# Patient Record
(demographics unavailable — no encounter records)

---

## 2024-12-06 NOTE — HEALTH RISK ASSESSMENT
[Good] : ~his/her~  mood as  good [Yes] : Yes [2 - 4 times a month (2 pts)] : 2-4 times a month (2 points) [1 or 2 (0 pts)] : 1 or 2 (0 points) [Never (0 pts)] : Never (0 points) [No] : In the past 12 months have you used drugs other than those required for medical reasons? No [No falls in past year] : Patient reported no falls in the past year [Little interest or pleasure doing things] : 1) Little interest or pleasure doing things [Feeling down, depressed, or hopeless] : 2) Feeling down, depressed, or hopeless [1] : 2) Feeling down, depressed, or hopeless for several days (1) [PHQ-2 Positive] : PHQ-2 Positive [Several Days (1)] : 7.) Trouble concentrating on things, such as reading a newspaper or watching television? Several days [Not at All (0)] : 9.) Thoughts that you would be off dead or of hurting yourself in some way? Not at all [Mild] : Severity of Depression is Mild [Somewhat Difficult] : How difficult have these problems made it for you to do your work, take care of things at home, or get along with people? Somewhat difficult [PHQ-9 Negative - No further assessment needed] : PHQ-9 Negative - No further assessment needed [Never] : Never [[id number]] : 2 [[id number]] : 7

## 2024-12-06 NOTE — HISTORY OF PRESENT ILLNESS
[FreeTextEntry1] : physical exam [de-identified] : MATT SHORE is a 42-year-old F who presents today for an annual physical. Pt last had a mammogram 12/2024 which revealed right breast asymmetry. Pt last saw OBGYN 11/2024. Pt last had a colonoscopy in 4/21. Pt was on Fluoxetine two years ago, which she has stopped on her own.

## 2024-12-06 NOTE — END OF VISIT
[FreeTextEntry3] : "I, Jon Parra, personally scribed the services dictated to me by Dr. Oscar Field MD in this documentation on 12/06/2024"   "I, Dr. Oscar Field MD, personally performed the services described in this documentation on 12/06/2024 for the patient as scribed by Jon Parra in my presence. I have reviewed and verified that all the information is accurate and true."

## 2024-12-06 NOTE — PLAN
[FreeTextEntry1] : Advised to lose weight Further instructions pending lab results Sent for Diagnostic Mammogram and Breast Sonogram

## 2025-01-24 NOTE — HISTORY OF PRESENT ILLNESS
[FreeTextEntry1] : Chart reviewed. 43 yo female, history of depression, anxiety here on video for psych follow up. Last saw Dr. Barlow Jan 2023, took Prozac for depression and anxiety in context of interpersonal difficulties with mother in law. Reports she thought Prozac helped but later self-discontinued.. Today, pt endorses bouts of depression but denies depressed mood and not interested to resume Prozac. She describes having difficulties establishing deep relationships, states she is paranoid, cannot trust people, overanalyzes, optimistic then cuts people off. States she does not want to be a nomad in terms of relationships but she is afraid to be vulnerable. She reports history of engaging in individual psychotherapy for 6 months three time. Psychoeducation provided re: long term individual psychotherapy, indications.

## 2025-01-24 NOTE — PLAN
[FreeTextEntry5] : No acute safety concerns. Psychotropic medication not indicated at this time. Recommend long term individual psychotherapy. Pt interested in [facility] referral. Routine medical follow up care. Psych follow up as needed.

## 2025-01-24 NOTE — REASON FOR VISIT
[Patient preference] : as per patient preference [Telehealth (audio & video) - Individual/Group] : This visit was provided via telehealth using real-time 2-way audio visual technology. [Home] : The patient, [unfilled], was located at home, [unfilled], at the time of the visit. [Participant(s) identity verified] : Participant(s) identity verified. [For new patient(s) – practitioner identifies themselves to participants] : Practitioner identifies themselves to participants. [Patient] : Patient [FreeTextEntry4] : 11am [FreeTextEntry5] : 11:30am [FreeTextEntry1] : relationship issues

## 2025-03-24 NOTE — SOCIAL HISTORY
[FreeTextEntry1] : Austin is a 43 y/o  female. She was born in [place], raised on [place] by her parents and maternal grandmother, they migrated from [place]. She graduated high school and attended [employer] and currently works as a Dosimetrist. Her  Minh is an ER physician and she reports a great relationship with him.

## 2025-03-24 NOTE — PSYCHOSOCIAL ASSESSMENT
[None known] : None known [Competitive and integrated employment] : Competitive and integrated employment [15 - 34 hours] : 15 - 34 hours
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
## 2025-03-24 NOTE — DISCUSSION/SUMMARY
[FreeTextEntry1] : Austin is a 41 y/o female, presenting with Depression, interested in individual therapy via telehealth to help better understand her relationships and support in parenting. She denies significant risk factors for self or other harm, and she is motivated for treatment. Her goals in treatment are to better cope and process her family's mental health, have better communication with her .  It is likely that cognitive behavioral therapy could be helpful to Austin based on her diagnosis.    [No] : No

## 2025-03-24 NOTE — PLAN
[Admit to Program     (Add Program Admission information to a new column in the Admit/Discharge Flowsheet)] : Admit to program [Every ___ week(s)] : Psychotherapy: Every [unfilled] week(s) [Individual Therapy] : Individual Therapy [FreeTextEntry4] : Problem: Depression and anger related to childhood trauma Goal: Patient will learn new coping skills related to triggers   Problem: Complex childhood trauma presenting daily living challenges. Goal: Pt will identify links between childhood and current relationships to improve relationships in the present by using CBT skills

## 2025-03-24 NOTE — REASON FOR VISIT
[Patient preference] : as per patient preference [Telehealth (audio & video) - Individual/Group] : This visit was provided via telehealth using real-time 2-way audio visual technology. [Other Location: e.g. Home (Enter Location, City,State)___] : The provider was located at [unfilled]. [Home] : The patient, [unfilled], was located at home, [unfilled], at the time of the visit. [FreeTextEntry4] : 12 [FreeTextEntry5] : 1 [Private Practice - Psychiatrist] : Private Practice - Psychiatrist [[facility] Provider/Facility] : [facility] Provider/Facility [Patient] : Patient
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
## 2025-03-27 NOTE — REASON FOR VISIT
[Patient preference] : as per patient preference [Telehealth (audio & video) - Individual/Group] : This visit was provided via telehealth using real-time 2-way audio visual technology. [Other Location: e.g. Home (Enter Location, City,State)___] : The provider was located at [unfilled]. [Home] : The patient, [unfilled], was located at home, [unfilled], at the time of the visit. [FreeTextEntry4] : 12 [FreeTextEntry5] : 1 [Private Practice - Psychiatrist] : Private Practice - Psychiatrist [[facility] Provider/Facility] : [facility] Provider/Facility [Patient] : Patient

## 2025-04-25 NOTE — DISCUSSION/SUMMARY
[FreeTextEntry1] : Therapist met with patient for individual session. Session focused on relationships and conflicts and feelings of control. Discussed lack of relationship with mother in law and desire to change this. Explored ways to approach reconcilliation. Therapist offered support and validation.

## 2025-04-25 NOTE — REASON FOR VISIT
[Patient preference] : as per patient preference [Telehealth (audio & video) - Individual/Group] : This visit was provided via telehealth using real-time 2-way audio visual technology. [Other Location: e.g. Home (Enter Location, City,State)___] : The provider was located at [unfilled]. [Home] : The patient, [unfilled], was located at home, [unfilled], at the time of the visit. [FreeTextEntry4] : 12 [FreeTextEntry5] : 1 [Private Practice - Psychiatrist] : Private Practice - Psychiatrist [[facility] Provider/Facility] : [facility] Provider/Facility [Patient] : Patient

## 2025-05-02 NOTE — REASON FOR VISIT
[Patient preference] : as per patient preference [Telehealth (audio & video) - Individual/Group] : This visit was provided via telehealth using real-time 2-way audio visual technology. [Other Location: e.g. Home (Enter Location, City,State)___] : The provider was located at [unfilled]. [Home] : The patient, [unfilled], was located at home, [unfilled], at the time of the visit. [FreeTextEntry4] : 12 [FreeTextEntry5] : 1 [Private Practice - Psychiatrist] : Private Practice - Psychiatrist [[facility] Provider/Facility] : [facility] Provider/Facility [Patient] : Patient

## 2025-05-02 NOTE — DISCUSSION/SUMMARY
[FreeTextEntry1] : Therapist met with patient for individual session. Session focused on patients feelings about working towards improving her relationship with her mother in law, explored obstacles in control and identifying stressors and anxiety, explored paranoid thoughts and relationships. Therapist offered support.

## 2025-05-12 NOTE — REASON FOR VISIT
[Patient preference] : as per patient preference [Telehealth (audio & video) - Individual/Group] : This visit was provided via telehealth using real-time 2-way audio visual technology. [Other Location: e.g. Home (Enter Location, City,State)___] : The provider was located at [unfilled]. [Home] : The patient, [unfilled], was located at home, [unfilled], at the time of the visit. [FreeTextEntry4] : 12 [FreeTextEntry5] : 1 [Private Practice - Psychiatrist] : Private Practice - Psychiatrist [[facility] Provider/Facility] : [facility] Provider/Facility [Patient] : Patient

## 2025-05-12 NOTE — DISCUSSION/SUMMARY
[FreeTextEntry1] : Therapist met with patient for individual session. Session focused on patients mood, and ambivalence about reconnecting with mother in law. Discussed feelings assoicated with the relationship and parallels in parenting. Therapist offered support and validation.

## 2025-05-16 NOTE — DISCUSSION/SUMMARY
[FreeTextEntry1] : Therapist met with patient for individual session. Session focused on patients mood, and parenting obstacles. Discussed relationships and relationship goals, explored past expectations of winning and losing in relationships and misperceptions. Discussed ambivalence in repairing relationship with MIL and steps to conquer fixing it as well.

## 2025-05-16 NOTE — REASON FOR VISIT
[Patient preference] : as per patient preference [Telehealth (audio & video) - Individual/Group] : This visit was provided via telehealth using real-time 2-way audio visual technology. [Other Location: e.g. Home (Enter Location, City,State)___] : The provider was located at [unfilled]. [Home] : The patient, [unfilled], was located at home, [unfilled], at the time of the visit. [FreeTextEntry4] : 12 [FreeTextEntry5] : 1 [Private Practice - Psychiatrist] : Private Practice - Psychiatrist [[facility] Provider/Facility] : [facility] Provider/Facility [Patient] : Patient

## 2025-05-30 NOTE — REASON FOR VISIT
[Patient preference] : as per patient preference [Telehealth (audio & video) - Individual/Group] : This visit was provided via telehealth using real-time 2-way audio visual technology. [Other Location: e.g. Home (Enter Location, City,State)___] : The provider was located at [unfilled]. [Home] : The patient, [unfilled], was located at home, [unfilled], at the time of the visit. [FreeTextEntry4] : 12 [FreeTextEntry5] : 1 [Private Practice - Psychiatrist] : Private Practice - Psychiatrist [[facility] Provider/Facility] : [facility] Provider/Facility [Patient] : Patient

## 2025-05-30 NOTE — DISCUSSION/SUMMARY
[FreeTextEntry1] : Therapist met with patient for individual session. Session focused on patients processing of events, discussed events with her daughters and explored their reasoning. Patient made a big step in speaking to her  about reconciling her relationship with her mother in law. Therapist offered support and validation.

## 2025-06-13 NOTE — REASON FOR VISIT
[Patient preference] : as per patient preference [Telehealth (audio & video) - Individual/Group] : This visit was provided via telehealth using real-time 2-way audio visual technology. [Other Location: e.g. Home (Enter Location, City,State)___] : The provider was located at [unfilled]. [Home] : The patient, [unfilled], was located at home, [unfilled], at the time of the visit. [FreeTextEntry4] : 12 [FreeTextEntry5] : 1 [Private Practice - Psychiatrist] : Private Practice - Psychiatrist [[facility] Provider/Facility] : [facility] Provider/Facility [Patient] : Patient

## 2025-06-13 NOTE — DISCUSSION/SUMMARY
[FreeTextEntry1] : Therapist met with patient for individual session. Session focused on friendships and dynamics involved, explored feelings associated with friendships and vulnerability.

## 2025-06-20 NOTE — REASON FOR VISIT
[Patient preference] : as per patient preference [Telehealth (audio & video) - Individual/Group] : This visit was provided via telehealth using real-time 2-way audio visual technology. [Other Location: e.g. Home (Enter Location, City,State)___] : The provider was located at [unfilled]. [Home] : The patient, [unfilled], was located at home, [unfilled], at the time of the visit. [FreeTextEntry4] : 12 [FreeTextEntry5] : 1 [Private Practice - Psychiatrist] : Private Practice - Psychiatrist [[facility] Provider/Facility] : [facility] Provider/Facility [Patient] : Patient

## 2025-06-20 NOTE — DISCUSSION/SUMMARY
[FreeTextEntry1] : Therapist met with patient for individual session. Session focused on processing feellings and events with children and effective communication in reinforcing positive behavior and eliminating negative behaviors. Therapist offered support and validation.

## 2025-07-11 NOTE — DISCUSSION/SUMMARY
[FreeTextEntry1] : Therapist met with patient for individual session. Session focused on patients recent obstacles in navigating challenges with the kids and navigating motherhood. Therapist offered support and validation.

## 2025-07-11 NOTE — REASON FOR VISIT
[Patient preference] : as per patient preference [Telehealth (audio & video) - Individual/Group] : This visit was provided via telehealth using real-time 2-way audio visual technology. [Other Location: e.g. Home (Enter Location, City,State)___] : The provider was located at [unfilled]. [Home] : The patient, [unfilled], was located at home, [unfilled], at the time of the visit. [FreeTextEntry4] : 12 [FreeTextEntry5] : 1 [Private Practice - Psychiatrist] : Private Practice - Psychiatrist [[facility] Provider/Facility] : [facility] Provider/Facility [Patient] : Patient

## 2025-07-18 NOTE — REASON FOR VISIT
[Patient preference] : as per patient preference [Telehealth (audio & video) - Individual/Group] : This visit was provided via telehealth using real-time 2-way audio visual technology. [Other Location: e.g. Home (Enter Location, City,State)___] : The provider was located at [unfilled]. [Home] : The patient, [unfilled], was located at home, [unfilled], at the time of the visit. [FreeTextEntry4] : 12 [FreeTextEntry5] : 1 [Private Practice - Psychiatrist] : Private Practice - Psychiatrist [[facility] Provider/Facility] : [facility] Provider/Facility [Patient] : Patient

## 2025-07-18 NOTE — DISCUSSION/SUMMARY
[FreeTextEntry1] : Therapist met with patient for individual session. Session focused on patients relationship with her children and processing her own feelings associated with her childhood and improving her relationship with her children. Therapist offered support and validation.

## 2025-07-25 NOTE — DISCUSSION/SUMMARY
[FreeTextEntry1] : Therapist met with patient for individual session. Session focused on relationships and interactions, discussed historical mistrust and underlying causes and concerns. Discussed DBT skills and changing perspective on thoughts, therapist offered support and validation.

## 2025-07-25 NOTE — REASON FOR VISIT
[Patient preference] : as per patient preference [Telehealth (audio & video) - Individual/Group] : This visit was provided via telehealth using real-time 2-way audio visual technology. [Other Location: e.g. Home (Enter Location, City,State)___] : The provider was located at [unfilled]. [Home] : The patient, [unfilled], was located at home, [unfilled], at the time of the visit. [FreeTextEntry4] : 12 [FreeTextEntry5] : 1 [Private Practice - Psychiatrist] : Private Practice - Psychiatrist [[facility] Provider/Facility] : [facility] Provider/Facility [Patient] : Patient